# Patient Record
(demographics unavailable — no encounter records)

---

## 2025-07-30 NOTE — PHYSICAL EXAM
[Normocephalic] : normocephalic [Atraumatic] : atraumatic [Supple] : supple [No Supraclavicular Adenopathy] : no supraclavicular adenopathy [Examined in the supine and seated position] : examined in the supine and seated position [Symmetrical] : symmetrical [Grade 2] : Ptosis Grade 2 [No dominant masses] : no dominant masses left breast [No Nipple Retraction] : no left nipple retraction [No Nipple Discharge] : no left nipple discharge [No Axillary Lymphadenopathy] : no left axillary lymphadenopathy [No Edema] : no edema [No Rashes] : no rashes [No Ulceration] : no ulceration [de-identified] : lateral 9:00 there is nodularity, slight nodularity retro

## 2025-07-30 NOTE — ASSESSMENT
[FreeTextEntry1] : The pathology and imaging results were reviewed and discussed. She is a stage IA right breast cancer ([id number]) HER2 negative ER+/MO+ (AJCC 8thed).  Breast MRI biopsy was recommended.  The use of multimodality therapy for the treatment of breast cancer was discussed.   Surgical options were reviewed including a lumpectomy to negative margins, with whole breast radiation therapy versus a mastectomy with or without reconstruction. Included in this discussion with the results of the NSABP-04 and 06 trials.  Mastectomy techniques were discussed in detail including skin sparing and nipple sparing techniques. Recurrence was reviewed and that mastectomy does not confer a 100% risk reduction nor guarantee against breast cancer in the future.  Reconstructive options were briefly reviewed, including implant based versus tissue flap based reconstruction options.  Referral  to a plastic surgeon was offered.  The patient was informed that breast reconstruction is covered by insurance per state and federal laws.     Axillary staging was discussed. We reviewed the sentinel lymph node procedure, and how if the sentinel lymph node is found to have metastatic disease either on the intraoperative evaluation or on the final pathology, that an axillary dissection of the remaining lymph nodes may be recommended. It was explained that an axillary dissection takes "level one and level two" lymph nodes, and also "level three" if they feel clinically suspicious. It was explained that if the sentinel lymph node was not able to be found, that an axillary dissection may be necessary.  I reviewed the risks of lymphedema for SLND (6%) versus ALND (20%). I reviewed choosing wisely and ASBS guidelines, SOUND and INSEMA trials for SLNE omission and she is amenable to proceeding without SLNE. She is aware that SLNE is standard of care.   The Z-0011 study was touched upon, outlining that there is evidence that it may not be necessary to complete an axillary dissection in select patients even if one or two sentinel nodes are positive for cancer, as long as the cancer in the nodes is confined to within the nodes, the nodes aren't  matted down, and if the cancer meets certain criteria including being less than 5 cm, treated by lumpectomy and conventional whole breast radiation, and the patient is planning to take recommended adjuvant therapy, and didn't require preoperative chemotherapy.     The general indications for the use of adjuvant hormonal and chemotherapy and targeted therapies were discussed. It was explained that medical treatments are dependent on pathology results including receptor studies.   It was explained that some patients have further genetic testing of their tumors before a decision about chemotherapy can be made. Reviewed Oncotype testing.  The indications for radiation therapy and side effects were also discussed including the use varying lengths of whole breast radiation.  It was explained that radiation is generally reserved for lumpectomy patients, and patients with larger tumors or positive lymph nodes. Common side effects were reviewed. Discussed current trials looking at omission of RTx.  The genetics of breast cancer were discussed with the implications for risk of contralateral cancer and other associated cancers, implication for ovarian risk, options for management, and implications for family members. She is declining genetic testing at this time. She is aware it remains available to her.  She met with our cancer navigator and was given a cancer binder.  She is strongly motivated towards breast-conserving surgery. I will call her with 2nd opinion pathology and MRI biopsy pathology and formulate final surgical plan. I am in agreement with Dr. Gomez's surgical plan. She is aware and will let us know how she will proceed. I believe I was able to answer all of her questions to her satisfaction.

## 2025-07-30 NOTE — CONSULT LETTER
[Dear  ___] : Dear  [unfilled], [( Thank you for referring [unfilled] for consultation for _____ )] : Thank you for referring [unfilled] for consultation for [unfilled] [Please see my note below.] : Please see my note below. [Consult Closing:] : Thank you very much for allowing me to participate in the care of this patient.  If you have any questions, please do not hesitate to contact me. [Sincerely,] : Sincerely, [___] : [unfilled] [FreeTextEntry3] : Amelie [place]jas [place] [address]

## 2025-07-30 NOTE — CONSULT LETTER
[Dear  ___] : Dear  [unfilled], [( Thank you for referring [unfilled] for consultation for _____ )] : Thank you for referring [unfilled] for consultation for [unfilled] [Please see my note below.] : Please see my note below. [Consult Closing:] : Thank you very much for allowing me to participate in the care of this patient.  If you have any questions, please do not hesitate to contact me. [Sincerely,] : Sincerely, [___] : [unfilled] [FreeTextEntry3] : Amelie Rojas MS [place] [address]

## 2025-07-30 NOTE — HISTORY OF PRESENT ILLNESS
[FreeTextEntry1] : Jessica is a 63-year-old female referred for newly diagnosed invasive ductal carcinoma of the right breast. The pt's screening imaging (05/23/2025, [facility]) showed heterogeneously dense breast tissue with no suspicious findings bilaterally. Stable calcifications right UOQ and an unchanged right posterior 6 mm nodular density were noted. Ultrasound for density was ordered and performed on 06/16/2025 ([facility]). Ultrasound findings showed a new right 10:00 N7 0.7 cm slightly irregular hypoechoic nodule which was left to be indeterminate and ultrasound biopsy was advised and done on 06/27/2025 ([facility]). Additional right ultrasound findings showed a right 12:00 N5 stable 1.1 cm hypoechoic nodule and a right 11:00 small breast cyst. No suspicious findings were seen in the left breast on ultrasound.  Pathology of the right 10:00 (bowtie-shaped clip) ultrasound biopsy showed IDC, grade 4/9, ER moderately to strongly positive in > 90%, WA strongly positive in > 90%, Her2 negative and Ki67 20%. Post biopsy mammogram showed sanaz clip in the appropriate location of the right breast.  The patient consulted with Dr. Lila Gomez. An initial staging bilateral breast MRI was ordered and done on 07/23/2025 ([facility]). MRI findings showed an 0.6cm irregular enhancing mass in the right breast 10:00 adjacent to the marking clip in the area of know carcinoma. Additional non mass enhancement was seen in the right breast at 1:00 (measuring 1.4 cm) and 9:00 (measuring 1.4 cm). MRI guided biopsy was recommended for both the additional areas. The additional biopsies have not been performed yet.   Her previous breast history is positive for right core bx in 2002, 2006, and in 2007 with benign pathology and a right lumpectomy in 2007 (Dr. Lange) for mild focal epithelial atypia and LCIS. She experienced right sided mastitis when breast-feeding.   She does SBE. She has not noticed a change in her breast or a breast lump. She has not noticed a change in her nipple or nipple area. She has not noticed a change in the skin of the breast. She is not experiencing nipple discharge. She is not experiencing breast pain. She has not noticed a lump or lymph node under the armpit.   BREAST CANCER RISK FACTORS Menarche: 12 Menopause: 52 Grav: 3     Para: 2 (28yo daughter, 27yo son) Age at first live birth: 34 Nursed: yes Hysterectomy: no Oophorectomy: no  OCP: yes <2y duration HRT: no  Last pap/pelvic exam: 02/2025 WNL Related family history:  no Ashkenazi: no Mastery risk assessment: n/a Genetic testing: no Bra size: 36B   Last mammogram:    05/23/2025                          Location: [facility] Report reviewed.                                 Images reviewed. Results: Birads 2 Heterogeneously dense breast tissue No evidence of malignancy.   Last ultrasound:   06/16/2025                                Location: [facility] Report reviewed.                                 Images reviewed. Results: Birads 4 Right 10:00 N7 0.7 cm slightly irregular hypoechoic nodule. Rec: right ultrasound biopsy   Last MRI:  07/23/2025                                        Location: [facility] Report reviewed. Results: Birads 6 Right 10:00 0.6 irregular enhancing mass in the area of know carcinoma. Additional non mass enhancement was seen in the Right 1:00 1.4 cm area of non-mass enhancement Right 9:00 1.4 cm area of non-mass enhancement  No suspicious findings in left breast. Rec:  Right MRI biopsy both the additional areas.

## 2025-07-30 NOTE — PHYSICAL EXAM
[Normocephalic] : normocephalic [Atraumatic] : atraumatic [Supple] : supple [No Supraclavicular Adenopathy] : no supraclavicular adenopathy [Examined in the supine and seated position] : examined in the supine and seated position [Symmetrical] : symmetrical [Grade 2] : Ptosis Grade 2 [No dominant masses] : no dominant masses left breast [No Nipple Retraction] : no left nipple retraction [No Nipple Discharge] : no left nipple discharge [No Axillary Lymphadenopathy] : no left axillary lymphadenopathy [No Edema] : no edema [No Rashes] : no rashes [No Ulceration] : no ulceration [de-identified] : lateral 9:00 there is nodularity, slight nodularity retro

## 2025-07-30 NOTE — HISTORY OF PRESENT ILLNESS
[FreeTextEntry1] : Jessica is a 63-year-old female referred for newly diagnosed invasive ductal carcinoma of the right breast. The pt's screening imaging (05/23/2025, [facility]) showed heterogeneously dense breast tissue with no suspicious findings bilaterally. Stable calcifications right UOQ and an unchanged right posterior 6 mm nodular density were noted. Ultrasound for density was ordered and performed on 06/16/2025 ([facility]). Ultrasound findings showed a new right 10:00 N7 0.7 cm slightly irregular hypoechoic nodule which was left to be indeterminate and ultrasound biopsy was advised and done on 06/27/2025 ([facility]). Additional right ultrasound findings showed a right 12:00 N5 stable 1.1 cm hypoechoic nodule and a right 11:00 small breast cyst. No suspicious findings were seen in the left breast on ultrasound.  Pathology of the right 10:00 (bowtie-shaped clip) ultrasound biopsy showed IDC, grade 4/9, ER moderately to strongly positive in > 90%, MO strongly positive in > 90%, Her2 negative and Ki67 20%. Post biopsy mammogram showed sanaz clip in the appropriate location of the right breast.  The patient consulted with Dr. Lila Gomez. An initial staging bilateral breast MRI was ordered and done on 07/23/2025 ([facility]). MRI findings showed an 0.6cm irregular enhancing mass in the right breast 10:00 adjacent to the marking clip in the area of know carcinoma. Additional non mass enhancement was seen in the right breast at 1:00 (measuring 1.4 cm) and 9:00 (measuring 1.4 cm). MRI guided biopsy was recommended for both the additional areas. The additional biopsies have not been performed yet.   Her previous breast history is positive for right core bx in 2002, 2006, and in 2007 with benign pathology and a right lumpectomy in 2007 (Dr. Lange) for mild focal epithelial atypia and LCIS. She experienced right sided mastitis when breast-feeding.   She does SBE. She has not noticed a change in her breast or a breast lump. She has not noticed a change in her nipple or nipple area. She has not noticed a change in the skin of the breast. She is not experiencing nipple discharge. She is not experiencing breast pain. She has not noticed a lump or lymph node under the armpit.   BREAST CANCER RISK FACTORS Menarche: 12 Menopause: 52 Grav: 3     Para: 2 (28yo daughter, 25yo son) Age at first live birth: 34 Nursed: yes Hysterectomy: no Oophorectomy: no  OCP: yes <2y duration HRT: no  Last pap/pelvic exam: 02/2025 WNL Related family history:  no Ashkenazi: no Mastery risk assessment: n/a Genetic testing: no Bra size: 36B   Last mammogram:    05/23/2025                          Location: [facility] Report reviewed.                                 Images reviewed. Results: Birads 2 Heterogeneously dense breast tissue No evidence of malignancy.   Last ultrasound:   06/16/2025                                Location: [facility] Report reviewed.                                 Images reviewed. Results: Birads 4 Right 10:00 N7 0.7 cm slightly irregular hypoechoic nodule. Rec: right ultrasound biopsy   Last MRI:  07/23/2025                                        Location: [facility] Report reviewed. Results: Birads 6 Right 10:00 0.6 irregular enhancing mass in the area of know carcinoma. Additional non mass enhancement was seen in the Right 1:00 1.4 cm area of non-mass enhancement Right 9:00 1.4 cm area of non-mass enhancement  No suspicious findings in left breast. Rec:  Right MRI biopsy both the additional areas.

## 2025-07-30 NOTE — ASSESSMENT
[FreeTextEntry1] : The pathology and imaging results were reviewed and discussed. She is a stage IA right breast cancer ([id number]) HER2 negative ER+/KS+ (AJCC 8thed).  Breast MRI biopsy was recommended.  The use of multimodality therapy for the treatment of breast cancer was discussed.   Surgical options were reviewed including a lumpectomy to negative margins, with whole breast radiation therapy versus a mastectomy with or without reconstruction. Included in this discussion with the results of the NSABP-04 and 06 trials.  Mastectomy techniques were discussed in detail including skin sparing and nipple sparing techniques. Recurrence was reviewed and that mastectomy does not confer a 100% risk reduction nor guarantee against breast cancer in the future.  Reconstructive options were briefly reviewed, including implant based versus tissue flap based reconstruction options.  Referral  to a plastic surgeon was offered.  The patient was informed that breast reconstruction is covered by insurance per state and federal laws.     Axillary staging was discussed. We reviewed the sentinel lymph node procedure, and how if the sentinel lymph node is found to have metastatic disease either on the intraoperative evaluation or on the final pathology, that an axillary dissection of the remaining lymph nodes may be recommended. It was explained that an axillary dissection takes "level one and level two" lymph nodes, and also "level three" if they feel clinically suspicious. It was explained that if the sentinel lymph node was not able to be found, that an axillary dissection may be necessary.  I reviewed the risks of lymphedema for SLND (6%) versus ALND (20%). I reviewed choosing wisely and ASBS guidelines, SOUND and INSEMA trials for SLNE omission and she is amenable to proceeding without SLNE. She is aware that SLNE is standard of care.   The Z-0011 study was touched upon, outlining that there is evidence that it may not be necessary to complete an axillary dissection in select patients even if one or two sentinel nodes are positive for cancer, as long as the cancer in the nodes is confined to within the nodes, the nodes aren't  matted down, and if the cancer meets certain criteria including being less than 5 cm, treated by lumpectomy and conventional whole breast radiation, and the patient is planning to take recommended adjuvant therapy, and didn't require preoperative chemotherapy.     The general indications for the use of adjuvant hormonal and chemotherapy and targeted therapies were discussed. It was explained that medical treatments are dependent on pathology results including receptor studies.   It was explained that some patients have further genetic testing of their tumors before a decision about chemotherapy can be made. Reviewed Oncotype testing.  The indications for radiation therapy and side effects were also discussed including the use varying lengths of whole breast radiation.  It was explained that radiation is generally reserved for lumpectomy patients, and patients with larger tumors or positive lymph nodes. Common side effects were reviewed. Discussed current trials looking at omission of RTx.  The genetics of breast cancer were discussed with the implications for risk of contralateral cancer and other associated cancers, implication for ovarian risk, options for management, and implications for family members. She is declining genetic testing at this time. She is aware it remains available to her.  She met with our cancer navigator and was given a cancer binder.  She is strongly motivated towards breast-conserving surgery. I will call her with 2nd opinion pathology and MRI biopsy pathology and formulate final surgical plan. I am in agreement with Dr. Gomez's surgical plan. She is aware and will let us know how she will proceed. I believe I was able to answer all of her questions to her satisfaction.